# Patient Record
(demographics unavailable — no encounter records)

---

## 2023-04-06 DIAGNOSIS — Z13.220 SCREENING, LIPID: Primary | ICD-10-CM

## 2023-04-06 DIAGNOSIS — Z13.0 SCREENING, ANEMIA, DEFICIENCY, IRON: ICD-10-CM

## 2023-04-06 DIAGNOSIS — Z13.1 SCREENING FOR DIABETES MELLITUS: ICD-10-CM

## 2023-04-06 NOTE — PROGRESS NOTES
Called pt to notify std screening was neg. Pt asked about lab orders. Pt notified to fast for labs.  Orders in chart